# Patient Record
Sex: FEMALE | Race: WHITE | NOT HISPANIC OR LATINO | URBAN - METROPOLITAN AREA
[De-identification: names, ages, dates, MRNs, and addresses within clinical notes are randomized per-mention and may not be internally consistent; named-entity substitution may affect disease eponyms.]

---

## 2017-08-16 PROBLEM — Z00.00 ENCOUNTER FOR PREVENTIVE HEALTH EXAMINATION: Status: ACTIVE | Noted: 2017-08-16

## 2017-08-30 ENCOUNTER — EMERGENCY (EMERGENCY)
Facility: HOSPITAL | Age: 32
LOS: 1 days | Discharge: HOME | End: 2017-08-30
Admitting: EMERGENCY MEDICINE
Payer: COMMERCIAL

## 2017-08-30 VITALS
HEART RATE: 83 BPM | OXYGEN SATURATION: 100 % | DIASTOLIC BLOOD PRESSURE: 97 MMHG | SYSTOLIC BLOOD PRESSURE: 134 MMHG | RESPIRATION RATE: 16 BRPM | TEMPERATURE: 98 F

## 2017-08-30 PROCEDURE — 99284 EMERGENCY DEPT VISIT MOD MDM: CPT
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
## 2017-08-31 PROCEDURE — 73502 X-RAY EXAM HIP UNI 2-3 VIEWS: CPT | Mod: 26,RT

## 2017-08-31 PROCEDURE — 73552 X-RAY EXAM OF FEMUR 2/>: CPT | Mod: 26,RT

## 2017-08-31 PROCEDURE — 72125 CT NECK SPINE W/O DYE: CPT | Mod: 26

## 2017-08-31 PROCEDURE — 73564 X-RAY EXAM KNEE 4 OR MORE: CPT | Mod: 26,RT

## 2017-08-31 PROCEDURE — 70450 CT HEAD/BRAIN W/O DYE: CPT | Mod: 26

## 2017-08-31 PROCEDURE — 70486 CT MAXILLOFACIAL W/O DYE: CPT | Mod: 26

## 2017-08-31 RX ORDER — DIAZEPAM 5 MG
1 TABLET ORAL
Qty: 15 | Refills: 0
Start: 2017-08-31

## 2017-08-31 RX ORDER — IBUPROFEN 200 MG
1 TABLET ORAL
Qty: 30 | Refills: 0
Start: 2017-08-31

## 2017-08-31 RX ORDER — CEPHALEXIN 500 MG
1 CAPSULE ORAL
Qty: 10 | Refills: 0 | OUTPATIENT
Start: 2017-08-31 | End: 2017-09-05

## 2017-08-31 RX ORDER — SODIUM CHLORIDE 9 MG/ML
1000 INJECTION INTRAMUSCULAR; INTRAVENOUS; SUBCUTANEOUS ONCE
Qty: 0 | Refills: 0 | Status: COMPLETED | OUTPATIENT
Start: 2017-08-31 | End: 2017-08-31

## 2017-08-31 RX ORDER — ACETAMINOPHEN 500 MG
975 TABLET ORAL ONCE
Qty: 0 | Refills: 0 | Status: COMPLETED | OUTPATIENT
Start: 2017-08-31 | End: 2017-08-31

## 2017-08-31 RX ORDER — TETANUS TOXOID, REDUCED DIPHTHERIA TOXOID AND ACELLULAR PERTUSSIS VACCINE, ADSORBED 5; 2.5; 8; 8; 2.5 [IU]/.5ML; [IU]/.5ML; UG/.5ML; UG/.5ML; UG/.5ML
0.5 SUSPENSION INTRAMUSCULAR ONCE
Qty: 0 | Refills: 0 | Status: COMPLETED | OUTPATIENT
Start: 2017-08-31 | End: 2017-08-31

## 2017-08-31 RX ADMIN — TETANUS TOXOID, REDUCED DIPHTHERIA TOXOID AND ACELLULAR PERTUSSIS VACCINE, ADSORBED 0.5 MILLILITER(S): 5; 2.5; 8; 8; 2.5 SUSPENSION INTRAMUSCULAR at 03:07

## 2017-08-31 RX ADMIN — Medication 975 MILLIGRAM(S): at 04:10

## 2017-08-31 RX ADMIN — SODIUM CHLORIDE 1000 MILLILITER(S): 9 INJECTION INTRAMUSCULAR; INTRAVENOUS; SUBCUTANEOUS at 03:41
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
## 2017-08-31 NOTE — ED PROVIDER NOTE - PLAN OF CARE
Follow up with your primary medical doctor within 1-2 days of ER discharge.  Follow up with your plastic surgeon within the next few days.  Follow up with your dentist or the [facility] within the coming week; call tomorrow to schedule an appointment ([phone]); be sure to mention that this is a follow up to your ED visit.  If you need to find any doctor(s) to follow up with, you may call the [facility] Patient Access Services helpline at [phone] to find names/contact #s for a practitioner (or specialist) to follow up with.  Bring your discharge papers / test results with you to your follow up appointment(s).  Rest / no strenuous activity.  Stay well hydrated.  Cool compresses can be applied to any areas of injury to help improve swelling.  Eat a SOFT diet.  MEDICATIONS:  See attached medication sheet(s).  To follow up on any pending results, you may call the main ED # [phone] from 8:30am - 4pm; please ask to speak to the ED Administrative PA or Resident.  ***Return to the Emergency Department if you experience any new / worsening symptoms or have any problems / concerns.***
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
## 2017-08-31 NOTE — ED PROVIDER NOTE - PROGRESS NOTE DETAILS
VALERIA Redmond Addendum----  This patient was signed out to me by VALERIA Hughes at 0315 hrs; pt pending CT scans/results.  Dental team completed their evaluation; recommended soft diet, OTC pain medication, and f/u in dental clinic.  Xrays of right knee/femur/hip prelim reads state no acute findings; pt will be given Admin # to f/u on official reads expected on day shift.  CT head/cspine/maxillofacial negative for acute pathology with exception of "There are acute minimally displaced bilateral nasal bone fractures. No other acute fractures are noted."  Pt made aware of findings; pt states hx/o remote rhinoplasty; pt advised to closely f/u with her plastic surgeon; pt states she will arrange this f/u.  Pt. will be d/c'd per below instructions as discussed with VALERIA Hughes.
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
## 2018-07-06 ENCOUNTER — EMERGENCY (EMERGENCY)
Facility: HOSPITAL | Age: 33
LOS: 1 days | Discharge: HOME | End: 2018-07-06
Attending: EMERGENCY MEDICINE | Admitting: EMERGENCY MEDICINE
Payer: MEDICAID

## 2018-07-06 VITALS
DIASTOLIC BLOOD PRESSURE: 70 MMHG | OXYGEN SATURATION: 100 % | SYSTOLIC BLOOD PRESSURE: 135 MMHG | RESPIRATION RATE: 16 BRPM | HEART RATE: 74 BPM | TEMPERATURE: 99 F

## 2018-07-06 VITALS
SYSTOLIC BLOOD PRESSURE: 97 MMHG | RESPIRATION RATE: 15 BRPM | DIASTOLIC BLOOD PRESSURE: 61 MMHG | TEMPERATURE: 98 F | HEART RATE: 66 BPM | OXYGEN SATURATION: 100 %

## 2018-07-06 LAB
ALBUMIN SERPL ELPH-MCNC: 4.9 G/DL — SIGNIFICANT CHANGE UP (ref 3.3–5)
ALP SERPL-CCNC: 48 U/L — SIGNIFICANT CHANGE UP (ref 40–120)
ALT FLD-CCNC: 11 U/L — SIGNIFICANT CHANGE UP (ref 4–33)
ANISOCYTOSIS BLD QL: SLIGHT — SIGNIFICANT CHANGE UP
APPEARANCE UR: CLEAR — SIGNIFICANT CHANGE UP
AST SERPL-CCNC: 13 U/L — SIGNIFICANT CHANGE UP (ref 4–32)
BACTERIA # UR AUTO: SIGNIFICANT CHANGE UP
BASE EXCESS BLDV CALC-SCNC: 0.7 MMOL/L — SIGNIFICANT CHANGE UP
BASOPHILS # BLD AUTO: 0.03 K/UL — SIGNIFICANT CHANGE UP (ref 0–0.2)
BASOPHILS NFR BLD AUTO: 0.3 % — SIGNIFICANT CHANGE UP (ref 0–2)
BASOPHILS NFR SPEC: 0 % — SIGNIFICANT CHANGE UP (ref 0–2)
BILIRUB SERPL-MCNC: 0.8 MG/DL — SIGNIFICANT CHANGE UP (ref 0.2–1.2)
BILIRUB UR-MCNC: NEGATIVE — SIGNIFICANT CHANGE UP
BLASTS # FLD: 0 % — SIGNIFICANT CHANGE UP (ref 0–0)
BLOOD GAS VENOUS - CREATININE: 0.62 MG/DL — SIGNIFICANT CHANGE UP (ref 0.5–1.3)
BLOOD UR QL VISUAL: NEGATIVE — SIGNIFICANT CHANGE UP
BUN SERPL-MCNC: 8 MG/DL — SIGNIFICANT CHANGE UP (ref 7–23)
CALCIUM SERPL-MCNC: 9.7 MG/DL — SIGNIFICANT CHANGE UP (ref 8.4–10.5)
CHLORIDE BLDV-SCNC: 108 MMOL/L — SIGNIFICANT CHANGE UP (ref 96–108)
CHLORIDE SERPL-SCNC: 101 MMOL/L — SIGNIFICANT CHANGE UP (ref 98–107)
CO2 SERPL-SCNC: 25 MMOL/L — SIGNIFICANT CHANGE UP (ref 22–31)
COLOR SPEC: SIGNIFICANT CHANGE UP
CREAT SERPL-MCNC: 0.66 MG/DL — SIGNIFICANT CHANGE UP (ref 0.5–1.3)
EOSINOPHIL # BLD AUTO: 0.07 K/UL — SIGNIFICANT CHANGE UP (ref 0–0.5)
EOSINOPHIL NFR BLD AUTO: 0.7 % — SIGNIFICANT CHANGE UP (ref 0–6)
EOSINOPHIL NFR FLD: 1.7 % — SIGNIFICANT CHANGE UP (ref 0–6)
GAS PNL BLDV: 136 MMOL/L — SIGNIFICANT CHANGE UP (ref 136–146)
GIANT PLATELETS BLD QL SMEAR: PRESENT — SIGNIFICANT CHANGE UP
GLUCOSE BLDV-MCNC: 90 — SIGNIFICANT CHANGE UP (ref 70–99)
GLUCOSE SERPL-MCNC: 84 MG/DL — SIGNIFICANT CHANGE UP (ref 70–99)
GLUCOSE UR-MCNC: NEGATIVE — SIGNIFICANT CHANGE UP
HCG SERPL-ACNC: SIGNIFICANT CHANGE UP MIU/ML
HCO3 BLDV-SCNC: 24 MMOL/L — SIGNIFICANT CHANGE UP (ref 20–27)
HCT VFR BLD CALC: 36.7 % — SIGNIFICANT CHANGE UP (ref 34.5–45)
HCT VFR BLDV CALC: 38.5 % — SIGNIFICANT CHANGE UP (ref 34.5–45)
HGB BLD-MCNC: 11.4 G/DL — LOW (ref 11.5–15.5)
HGB BLDV-MCNC: 12.5 G/DL — SIGNIFICANT CHANGE UP (ref 11.5–15.5)
HYPOCHROMIA BLD QL: SLIGHT — SIGNIFICANT CHANGE UP
IMM GRANULOCYTES # BLD AUTO: 0.04 # — SIGNIFICANT CHANGE UP
IMM GRANULOCYTES NFR BLD AUTO: 0.4 % — SIGNIFICANT CHANGE UP (ref 0–1.5)
KETONES UR-MCNC: NEGATIVE — SIGNIFICANT CHANGE UP
LACTATE BLDV-MCNC: 2.4 MMOL/L — HIGH (ref 0.5–2)
LEUKOCYTE ESTERASE UR-ACNC: NEGATIVE — SIGNIFICANT CHANGE UP
LYMPHOCYTES # BLD AUTO: 3.54 K/UL — HIGH (ref 1–3.3)
LYMPHOCYTES # BLD AUTO: 33.7 % — SIGNIFICANT CHANGE UP (ref 13–44)
LYMPHOCYTES NFR SPEC AUTO: 27.8 % — SIGNIFICANT CHANGE UP (ref 13–44)
MCHC RBC-ENTMCNC: 21.4 PG — LOW (ref 27–34)
MCHC RBC-ENTMCNC: 31.1 % — LOW (ref 32–36)
MCV RBC AUTO: 68.9 FL — LOW (ref 80–100)
METAMYELOCYTES # FLD: 0 % — SIGNIFICANT CHANGE UP (ref 0–1)
MICROCYTES BLD QL: SIGNIFICANT CHANGE UP
MONOCYTES # BLD AUTO: 0.74 K/UL — SIGNIFICANT CHANGE UP (ref 0–0.9)
MONOCYTES NFR BLD AUTO: 7 % — SIGNIFICANT CHANGE UP (ref 2–14)
MONOCYTES NFR BLD: 4.4 % — SIGNIFICANT CHANGE UP (ref 2–9)
MYELOCYTES NFR BLD: 0 % — SIGNIFICANT CHANGE UP (ref 0–0)
NEUTROPHIL AB SER-ACNC: 62.6 % — SIGNIFICANT CHANGE UP (ref 43–77)
NEUTROPHILS # BLD AUTO: 6.09 K/UL — SIGNIFICANT CHANGE UP (ref 1.8–7.4)
NEUTROPHILS NFR BLD AUTO: 57.9 % — SIGNIFICANT CHANGE UP (ref 43–77)
NEUTS BAND # BLD: 0.9 % — SIGNIFICANT CHANGE UP (ref 0–6)
NITRITE UR-MCNC: NEGATIVE — SIGNIFICANT CHANGE UP
NON-SQ EPI CELLS # UR AUTO: <1 — SIGNIFICANT CHANGE UP
NRBC # FLD: 0 — SIGNIFICANT CHANGE UP
OTHER - HEMATOLOGY %: 0 — SIGNIFICANT CHANGE UP
OVALOCYTES BLD QL SMEAR: SLIGHT — SIGNIFICANT CHANGE UP
PCO2 BLDV: 42 MMHG — SIGNIFICANT CHANGE UP (ref 41–51)
PH BLDV: 7.39 PH — SIGNIFICANT CHANGE UP (ref 7.32–7.43)
PH UR: 7 — SIGNIFICANT CHANGE UP (ref 4.6–8)
PLATELET # BLD AUTO: 298 K/UL — SIGNIFICANT CHANGE UP (ref 150–400)
PLATELET COUNT - ESTIMATE: NORMAL — SIGNIFICANT CHANGE UP
PMV BLD: 10.7 FL — SIGNIFICANT CHANGE UP (ref 7–13)
PO2 BLDV: 31 MMHG — LOW (ref 35–40)
POLYCHROMASIA BLD QL SMEAR: SLIGHT — SIGNIFICANT CHANGE UP
POTASSIUM BLDV-SCNC: 3.6 MMOL/L — SIGNIFICANT CHANGE UP (ref 3.4–4.5)
POTASSIUM SERPL-MCNC: 3.6 MMOL/L — SIGNIFICANT CHANGE UP (ref 3.5–5.3)
POTASSIUM SERPL-SCNC: 3.6 MMOL/L — SIGNIFICANT CHANGE UP (ref 3.5–5.3)
PROMYELOCYTES # FLD: 0 % — SIGNIFICANT CHANGE UP (ref 0–0)
PROT SERPL-MCNC: 7.6 G/DL — SIGNIFICANT CHANGE UP (ref 6–8.3)
PROT UR-MCNC: NEGATIVE MG/DL — SIGNIFICANT CHANGE UP
RBC # BLD: 5.33 M/UL — HIGH (ref 3.8–5.2)
RBC # FLD: 14.7 % — HIGH (ref 10.3–14.5)
RBC CASTS # UR COMP ASSIST: SIGNIFICANT CHANGE UP (ref 0–?)
REVIEW TO FOLLOW: YES — SIGNIFICANT CHANGE UP
SAO2 % BLDV: 51.3 % — LOW (ref 60–85)
SODIUM SERPL-SCNC: 137 MMOL/L — SIGNIFICANT CHANGE UP (ref 135–145)
SP GR SPEC: 1 — SIGNIFICANT CHANGE UP (ref 1–1.04)
SQUAMOUS # UR AUTO: SIGNIFICANT CHANGE UP
UROBILINOGEN FLD QL: NORMAL MG/DL — SIGNIFICANT CHANGE UP
VARIANT LYMPHS # BLD: 2.6 % — SIGNIFICANT CHANGE UP
WBC # BLD: 10.51 K/UL — HIGH (ref 3.8–10.5)
WBC # FLD AUTO: 10.51 K/UL — HIGH (ref 3.8–10.5)
WBC UR QL: SIGNIFICANT CHANGE UP (ref 0–?)

## 2018-07-06 PROCEDURE — 99284 EMERGENCY DEPT VISIT MOD MDM: CPT

## 2018-07-06 PROCEDURE — 71046 X-RAY EXAM CHEST 2 VIEWS: CPT | Mod: 26

## 2018-07-06 PROCEDURE — 76830 TRANSVAGINAL US NON-OB: CPT | Mod: 26

## 2018-07-06 RX ORDER — ONDANSETRON 8 MG/1
4 TABLET, FILM COATED ORAL ONCE
Qty: 0 | Refills: 0 | Status: COMPLETED | OUTPATIENT
Start: 2018-07-06 | End: 2018-07-06

## 2018-07-06 RX ORDER — SODIUM CHLORIDE 9 MG/ML
1000 INJECTION INTRAMUSCULAR; INTRAVENOUS; SUBCUTANEOUS ONCE
Qty: 0 | Refills: 0 | Status: COMPLETED | OUTPATIENT
Start: 2018-07-06 | End: 2018-07-06

## 2018-07-06 RX ADMIN — ONDANSETRON 4 MILLIGRAM(S): 8 TABLET, FILM COATED ORAL at 19:47

## 2018-07-06 RX ADMIN — SODIUM CHLORIDE 1000 MILLILITER(S): 9 INJECTION INTRAMUSCULAR; INTRAVENOUS; SUBCUTANEOUS at 19:47

## 2018-07-06 NOTE — ED ADULT NURSE NOTE - OBJECTIVE STATEMENT
pt is a 32 yr female c/o cramping/ abd pain/ anxiety x 1 day. pt has + pregnancy test at home. piv placed, labs sent. no s/s of acute distress. vss, pt ekg done x2 with stable ekg noted. +nausea, no vomitting/ diahrea. + frequent urination with mild back pain. will ctm. boyfriend at bedside

## 2018-07-06 NOTE — ED PROVIDER NOTE - OBJECTIVE STATEMENT
Patient is 32 y F  3 weeks pregnant with no PMH no daily meds presenting with generalized weakness, crampy sharp bilateral inguinal abd pain worse when coughing for the last 2 weeks. Has been vomiting several times per day, nonbloody, She took home pregnancy test, which was positive, since then has had increased abd pain, intermittent nonexertional SOB, dizziness. Had spotting 2-3 weeks ago, no vaginal bleeding this week.   Has not had ultrasound or seen OB.   LMP: 5/3  No abdominal surgeries    PMD: none  OB: none  ROS: Denies fever, palpitations, chills, recent sickness, HA, vision changes, cough, chest pain, dysuria, hematuria, rash, new joint aches, sick contacts, and recent travel.

## 2018-07-06 NOTE — ED PROVIDER NOTE - ATTENDING CONTRIBUTION TO CARE
Dr. Castellanos:  I have personally performed a face to face bedside history and physical examination of this patient. I have discussed the history, examination, review of systems, assessment and plan of management with the resident. I have reviewed the electronic medical record and amended it to reflect my history, review of systems, physical exam, assessment and plan.    32F  LMP 5/3 presents with c/o intermittent pelvic pain radiating to back that is worse when coughing.  +intermittent SOB associated with the pain.  Also endorses N/V and generalized weakness.  Found out she is pregnant 3 days ago via home pregnancy test.  +increased urinary frequency    Exam:  - seems anxious  - rrr  - ctab  - abd soft ntnd, no cvat  - no focal neuro deficits    A/P  - abd pain in pregnancy, r/o ectopic, eval for UTI; cough and SOB intermittent, eval PNA  - cbc, cmp, hcg, ua, urine culture  - CXR, TVUS  - zofran, IVF

## 2018-07-06 NOTE — ED ADULT NURSE NOTE - CHIEF COMPLAINT QUOTE
Pt c/o weakness/dizziness/abd cramping/anxiety x 3 days. Pt states she is 3 weeks pregnant. Denies vag bleeding. Pt appears comfortable.  pt upgraded due to abnormal EKG as per MD
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
## 2021-11-01 ENCOUNTER — EMERGENCY (EMERGENCY)
Facility: HOSPITAL | Age: 36
LOS: 1 days | Discharge: HOME | End: 2021-11-01
Attending: HOSPITALIST | Admitting: HOSPITALIST
Payer: COMMERCIAL

## 2021-11-01 VITALS
HEART RATE: 69 BPM | TEMPERATURE: 98 F | RESPIRATION RATE: 18 BRPM | DIASTOLIC BLOOD PRESSURE: 67 MMHG | SYSTOLIC BLOOD PRESSURE: 118 MMHG | OXYGEN SATURATION: 100 %

## 2021-11-01 DIAGNOSIS — Z98.82 BREAST IMPLANT STATUS: Chronic | ICD-10-CM

## 2021-11-01 DIAGNOSIS — Z98.890 OTHER SPECIFIED POSTPROCEDURAL STATES: Chronic | ICD-10-CM

## 2021-11-01 LAB
ALBUMIN SERPL ELPH-MCNC: 4.2 G/DL — SIGNIFICANT CHANGE UP (ref 3.3–5)
ALP SERPL-CCNC: 48 U/L — SIGNIFICANT CHANGE UP (ref 40–120)
ALT FLD-CCNC: 26 U/L — SIGNIFICANT CHANGE UP (ref 4–33)
ANION GAP SERPL CALC-SCNC: 9 MMOL/L — SIGNIFICANT CHANGE UP (ref 7–14)
AST SERPL-CCNC: 20 U/L — SIGNIFICANT CHANGE UP (ref 4–32)
BILIRUB SERPL-MCNC: 0.3 MG/DL — SIGNIFICANT CHANGE UP (ref 0.2–1.2)
BUN SERPL-MCNC: 8 MG/DL — SIGNIFICANT CHANGE UP (ref 7–23)
CALCIUM SERPL-MCNC: 8.7 MG/DL — SIGNIFICANT CHANGE UP (ref 8.4–10.5)
CHLORIDE SERPL-SCNC: 106 MMOL/L — SIGNIFICANT CHANGE UP (ref 98–107)
CO2 SERPL-SCNC: 21 MMOL/L — LOW (ref 22–31)
CREAT SERPL-MCNC: 0.48 MG/DL — LOW (ref 0.5–1.3)
GLUCOSE SERPL-MCNC: 79 MG/DL — SIGNIFICANT CHANGE UP (ref 70–99)
HCG SERPL-ACNC: SIGNIFICANT CHANGE UP MIU/ML
HCT VFR BLD CALC: 30.8 % — LOW (ref 34.5–45)
HGB BLD-MCNC: 10.2 G/DL — LOW (ref 11.5–15.5)
IANC: 4.3 K/UL — SIGNIFICANT CHANGE UP (ref 1.5–8.5)
MCHC RBC-ENTMCNC: 22.4 PG — LOW (ref 27–34)
MCHC RBC-ENTMCNC: 33.1 GM/DL — SIGNIFICANT CHANGE UP (ref 32–36)
MCV RBC AUTO: 67.7 FL — LOW (ref 80–100)
PLATELET # BLD AUTO: 279 K/UL — SIGNIFICANT CHANGE UP (ref 150–400)
POTASSIUM SERPL-MCNC: 4 MMOL/L — SIGNIFICANT CHANGE UP (ref 3.5–5.3)
POTASSIUM SERPL-SCNC: 4 MMOL/L — SIGNIFICANT CHANGE UP (ref 3.5–5.3)
PROT SERPL-MCNC: 6.5 G/DL — SIGNIFICANT CHANGE UP (ref 6–8.3)
RBC # BLD: 4.55 M/UL — SIGNIFICANT CHANGE UP (ref 3.8–5.2)
RBC # FLD: 14.7 % — HIGH (ref 10.3–14.5)
SODIUM SERPL-SCNC: 136 MMOL/L — SIGNIFICANT CHANGE UP (ref 135–145)
WBC # BLD: 8.37 K/UL — SIGNIFICANT CHANGE UP (ref 3.8–10.5)
WBC # FLD AUTO: 8.37 K/UL — SIGNIFICANT CHANGE UP (ref 3.8–10.5)

## 2021-11-01 PROCEDURE — 76705 ECHO EXAM OF ABDOMEN: CPT | Mod: 26

## 2021-11-01 PROCEDURE — 99285 EMERGENCY DEPT VISIT HI MDM: CPT

## 2021-11-01 PROCEDURE — 76805 OB US >/= 14 WKS SNGL FETUS: CPT | Mod: 26

## 2021-11-01 RX ORDER — LIDOCAINE 4 G/100G
1 CREAM TOPICAL ONCE
Refills: 0 | Status: COMPLETED | OUTPATIENT
Start: 2021-11-01 | End: 2021-11-01

## 2021-11-01 RX ORDER — ACETAMINOPHEN 500 MG
975 TABLET ORAL ONCE
Refills: 0 | Status: COMPLETED | OUTPATIENT
Start: 2021-11-01 | End: 2021-11-01

## 2021-11-01 RX ORDER — SODIUM CHLORIDE 9 MG/ML
1000 INJECTION INTRAMUSCULAR; INTRAVENOUS; SUBCUTANEOUS ONCE
Refills: 0 | Status: COMPLETED | OUTPATIENT
Start: 2021-11-01 | End: 2021-11-01

## 2021-11-01 RX ADMIN — Medication 975 MILLIGRAM(S): at 22:31

## 2021-11-01 RX ADMIN — LIDOCAINE 1 PATCH: 4 CREAM TOPICAL at 22:31
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
## 2021-11-01 NOTE — ED PROVIDER NOTE - PATIENT PORTAL LINK FT
You can access the FollowMyHealth Patient Portal offered by [facility] by registering at the following website: [email or web address]. By joining [facility]’s FollowMyHealth portal, you will also be able to view your health information using other applications (apps) compatible with our system.
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
## 2021-11-01 NOTE — ED PROVIDER NOTE - CRANIAL NERVE AND PUPILLARY EXAM
5/5 strength and intact sensation bilaterally in upper and lower extremity bilaterally./cranial nerves 2-12 intact
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
## 2021-11-01 NOTE — ED PROVIDER NOTE - NSCAREINITIATED _GEN_ER
9:42 PM  
55 y.o. female presents to ED C/O rash. Patient denies pertinent medical history. Patient reports rash developed about 4 days ago, rash developed after trying a new liquor drink. Patient denies associated lip swelling, tongue swelling, trouble swallowing, shortness of breath. Patient reports rash is painful, and it is tender to palpation, her bra is bothering it. Rash is located on back. Patient has tried Benadryl a few times without improvement in symptoms. Patient is a non-smoker. Patient denies any other symptoms or complaints. Past Medical History:  
Diagnosis Date  Degenerative tear of lateral meniscus of left knee 12/20/2015 Past Surgical History:  
Procedure Laterality Date 2001 [facility] KNEE ARTHROSCOPY  7-2-2015  
 left History reviewed. No pertinent family history. Social History Socioeconomic History  Marital status:  Spouse name: Not on file  Number of children: Not on file  Years of education: Not on file  Highest education level: Not on file Occupational History  Not on file Social Needs  Financial resource strain: Not on file  Food insecurity:  
  Worry: Not on file Inability: Not on file  Transportation needs:  
  Medical: Not on file Non-medical: Not on file Tobacco Use  Smoking status: Never Smoker  Smokeless tobacco: Never Used Substance and Sexual Activity  Alcohol use: Yes Alcohol/week: 1.2 oz Types: 2 Glasses of wine per week  Drug use: No  
 Sexual activity: Not Currently Lifestyle  Physical activity:  
  Days per week: Not on file Minutes per session: Not on file  Stress: Not on file Relationships  Social connections:  
  Talks on phone: Not on file Gets together: Not on file Attends Congregation service: Not on file Active member of club or organization: Not on file Attends meetings of clubs or organizations: Not on file Relationship status: Not on file  Intimate partner violence:  
  Fear of current or ex partner: Not on file Emotionally abused: Not on file Physically abused: Not on file Forced sexual activity: Not on file Other Topics Concern  Not on file Social History Narrative  Not on file ALLERGIES: Patient has no known allergies. Review of Systems Constitutional: Negative for appetite change and fever. HENT: Negative for congestion, rhinorrhea and sore throat. Respiratory: Negative for cough, shortness of breath and wheezing. Cardiovascular: Negative for chest pain and leg swelling. Gastrointestinal: Negative for abdominal pain, constipation, diarrhea, nausea and vomiting. Genitourinary: Negative for dysuria. Musculoskeletal: Negative for arthralgias and back pain. Skin: Positive for rash. Neurological: Negative for dizziness, syncope and headaches. All other systems reviewed and are negative. Vitals:  
 03/27/19 2113 BP: 105/72 Pulse: (!) 59 Resp: 18 Temp: 98.1 °F (36.7 °C) SpO2: 100% Weight: 62.1 kg (137 lb) Height: 5' 8\" (1.727 m) Physical Exam  
Constitutional: She is oriented to person, place, and time. She appears well-developed and well-nourished. No distress. HENT:  
Head: Atraumatic. Right Ear: External ear normal.  
Left Ear: External ear normal.  
Mouth/Throat: Oropharynx is clear and moist.  
Cardiovascular: Normal rate, regular rhythm and intact distal pulses. Pulmonary/Chest: Effort normal and breath sounds normal. No stridor. No respiratory distress. She has no wheezes. She has no rales. Musculoskeletal: Normal range of motion. Neurological: She is alert and oriented to person, place, and time. She exhibits normal muscle tone. Coordination normal.  
Skin: Rash noted. She is not diaphoretic. Nursing note and vitals reviewed.  
  
 
SEPIDEH 
 Number of Diagnoses or Management Options Herpes zoster without complication:  
Diagnosis management comments: Clinical impression: Shingles Physical exam is consistent with shingles, there is no ocular or ear involvement. Rash is tender and painful, educated to start viral treatment though slightly outside treatment window. Patient educated about nonpharmacological interventions for her discomfort. Patient referred to her primary care doctor for further evaluation. Patient educated to return the ED for any new or worsening symptoms. Patient denies further questions Procedures RESULTS: 
 
No orders to display Labs Reviewed - No data to display No results found for this or any previous visit (from the past 12 hour(s)). PROGRESS NOTE:  
9:42 PM  
Initial assessment completed. Written by Helen FISHER 
 
DISCHARGE NOTE: 
 
Junious All  results have been reviewed with her. She has been counseled regarding her diagnosis, treatment, and plan. She verbally conveys understanding and agreement of the signs, symptoms, diagnosis, treatment and prognosis and additionally agrees to follow up as discussed. She also agrees with the care-plan and conveys that all of her questions have been answered. I have also provided discharge instructions for her that include: educational information regarding their diagnosis and treatment, and list of reasons why they would want to return to the ED prior to their follow-up appointment, should her condition change. CLINICAL IMPRESSION: 
 
1. Herpes zoster without complication AFTER VISIT PLAN: 
 
Discharge Medication List as of 3/27/2019 10:06 PM  
  
START taking these medications Details  
acyclovir (ZOVIRAX) 800 mg tablet Take 1 Tab by mouth four (4) times daily for 5 days. , Print, Disp-20 Tab, R-0  
  
  
CONTINUE these medications which have NOT CHANGED Details ASPIRIN/SALICYLAMIDE/CAFFEINE (BC HEADACHE POWDER PO) Take 1 Packet by mouth as needed., Historical Med DOCOSAHEXANOIC ACID/EPA (FISH OIL PO) Take 1,200 mg by mouth daily. , Historical Med  
  
calcium-cholecalciferol, d3, (CALCIUM 600 + D) 600-125 mg-unit tab Take  by mouth daily. , Historical Med  
  
multivitamin, tx-iron-ca-min (THERA-M W/ IRON) 9 mg iron-400 mcg tab tablet Take 1 Tab by mouth daily. , Historical Med Biotin 2,500 mcg cap Take  by mouth daily. , Historical Med  
  
  
STOP taking these medications  
  
 oxyCODONE-acetaminophen (PERCOCET) 5-325 mg per tablet Comments:  
Reason for Stopping: Follow-up Information Follow up With Specialties Details Why Contact Info Odin Rushing MD Family Practice Schedule an appointment as soon as possible for a visit in 1 week As needed [address] [address] 
[phone] [address] [facility] EMERGENCY DEPT Emergency Medicine Go to If symptoms worsen Maz[place] [address] [place], [place] [id number] [phone] Written by Saeid Garces NP-C 
 
 
 
 
 
 
 
 Krystal Robert)
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
## 2021-11-02 VITALS
RESPIRATION RATE: 18 BRPM | OXYGEN SATURATION: 100 % | DIASTOLIC BLOOD PRESSURE: 72 MMHG | TEMPERATURE: 98 F | SYSTOLIC BLOOD PRESSURE: 100 MMHG | HEART RATE: 72 BPM

## 2021-11-02 LAB
APPEARANCE UR: ABNORMAL
BACTERIA # UR AUTO: ABNORMAL
BASOPHILS # BLD AUTO: 0.08 K/UL — SIGNIFICANT CHANGE UP (ref 0–0.2)
BASOPHILS NFR BLD AUTO: 0.9 % — SIGNIFICANT CHANGE UP (ref 0–2)
BILIRUB UR-MCNC: NEGATIVE — SIGNIFICANT CHANGE UP
BLD GP AB SCN SERPL QL: NEGATIVE — SIGNIFICANT CHANGE UP
COLOR SPEC: COLORLESS — SIGNIFICANT CHANGE UP
DIFF PNL FLD: NEGATIVE — SIGNIFICANT CHANGE UP
EOSINOPHIL # BLD AUTO: 0.08 K/UL — SIGNIFICANT CHANGE UP (ref 0–0.5)
EOSINOPHIL NFR BLD AUTO: 0.9 % — SIGNIFICANT CHANGE UP (ref 0–6)
EPI CELLS # UR: ABNORMAL
GLUCOSE UR QL: NEGATIVE — SIGNIFICANT CHANGE UP
KETONES UR-MCNC: ABNORMAL
LEUKOCYTE ESTERASE UR-ACNC: NEGATIVE — SIGNIFICANT CHANGE UP
LYMPHOCYTES # BLD AUTO: 2.49 K/UL — SIGNIFICANT CHANGE UP (ref 1–3.3)
LYMPHOCYTES # BLD AUTO: 29.8 % — SIGNIFICANT CHANGE UP (ref 13–44)
MONOCYTES # BLD AUTO: 0.59 K/UL — SIGNIFICANT CHANGE UP (ref 0–0.9)
MONOCYTES NFR BLD AUTO: 7 % — SIGNIFICANT CHANGE UP (ref 2–14)
NEUTROPHILS # BLD AUTO: 4.7 K/UL — SIGNIFICANT CHANGE UP (ref 1.8–7.4)
NEUTROPHILS NFR BLD AUTO: 56.1 % — SIGNIFICANT CHANGE UP (ref 43–77)
NITRITE UR-MCNC: NEGATIVE — SIGNIFICANT CHANGE UP
PH UR: 7 — SIGNIFICANT CHANGE UP (ref 5–8)
PROT UR-MCNC: ABNORMAL
RBC CASTS # UR COMP ASSIST: SIGNIFICANT CHANGE UP /HPF (ref 0–4)
RH IG SCN BLD-IMP: POSITIVE — SIGNIFICANT CHANGE UP
SP GR SPEC: 1.01 — SIGNIFICANT CHANGE UP (ref 1–1.05)
UROBILINOGEN FLD QL: SIGNIFICANT CHANGE UP
WBC UR QL: SIGNIFICANT CHANGE UP /HPF (ref 0–5)

## 2021-11-02 PROCEDURE — 70450 CT HEAD/BRAIN W/O DYE: CPT | Mod: 26,MA

## 2021-11-02 PROCEDURE — 72125 CT NECK SPINE W/O DYE: CPT | Mod: 26,MA

## 2021-11-02 RX ORDER — SODIUM CHLORIDE 9 MG/ML
1000 INJECTION INTRAMUSCULAR; INTRAVENOUS; SUBCUTANEOUS ONCE
Refills: 0 | Status: COMPLETED | OUTPATIENT
Start: 2021-11-02 | End: 2021-11-02

## 2021-11-02 RX ADMIN — SODIUM CHLORIDE 1000 MILLILITER(S): 9 INJECTION INTRAMUSCULAR; INTRAVENOUS; SUBCUTANEOUS at 00:04

## 2021-11-02 RX ADMIN — LIDOCAINE 1 PATCH: 4 CREAM TOPICAL at 00:06

## 2021-11-02 RX ADMIN — SODIUM CHLORIDE 1000 MILLILITER(S): 9 INJECTION INTRAMUSCULAR; INTRAVENOUS; SUBCUTANEOUS at 01:24

## 2021-11-03 LAB
CULTURE RESULTS: SIGNIFICANT CHANGE UP
SPECIMEN SOURCE: SIGNIFICANT CHANGE UP

## 2022-04-13 ENCOUNTER — OUTPATIENT (OUTPATIENT)
Dept: INPATIENT UNIT | Facility: HOSPITAL | Age: 37
LOS: 1 days | Discharge: HOME | End: 2022-04-13
Payer: MEDICAID

## 2022-04-13 VITALS — DIASTOLIC BLOOD PRESSURE: 77 MMHG | HEART RATE: 80 BPM | SYSTOLIC BLOOD PRESSURE: 119 MMHG

## 2022-04-13 VITALS
DIASTOLIC BLOOD PRESSURE: 72 MMHG | HEART RATE: 76 BPM | SYSTOLIC BLOOD PRESSURE: 106 MMHG | TEMPERATURE: 98 F | RESPIRATION RATE: 16 BRPM

## 2022-04-13 DIAGNOSIS — Z98.82 BREAST IMPLANT STATUS: Chronic | ICD-10-CM

## 2022-04-13 DIAGNOSIS — Z98.890 OTHER SPECIFIED POSTPROCEDURAL STATES: Chronic | ICD-10-CM

## 2022-04-13 DIAGNOSIS — O26.899 OTHER SPECIFIED PREGNANCY RELATED CONDITIONS, UNSPECIFIED TRIMESTER: ICD-10-CM

## 2022-04-13 DIAGNOSIS — Z3A.00 WEEKS OF GESTATION OF PREGNANCY NOT SPECIFIED: ICD-10-CM

## 2022-04-13 PROCEDURE — 76818 FETAL BIOPHYS PROFILE W/NST: CPT | Mod: 26

## 2022-04-13 PROCEDURE — 99213 OFFICE O/P EST LOW 20 MIN: CPT | Mod: 25

## 2022-04-13 NOTE — OB PROVIDER TRIAGE NOTE - HISTORY OF PRESENT ILLNESS
36 y.o.  MALCOLM 2022 @ 39.1 weeks presents with c/o LOF since 9am, "white lea fluid." Pt denies ctx, VB, pruitis. States +FM and uncomplicated antepartum course. Pt is registered for prenatal care with [facility].    allergies:  azithromycin - dyspnea  robitussin - dyspnea  medications:  prenatal vitamins    med/surg hx:   bilateral breast augmentation   septoplasty   d+c x 2    OBBYN hx:  2005

## 2022-04-13 NOTE — OB PROVIDER TRIAGE NOTE - NSHPPHYSICALEXAM_GEN_ALL_CORE
abdomen soft, nontender  taus: sliup, cephalic presentation, anterior placenta, bpp 8/8, sheldon 13.5, efw 2875 grams  sse: moderate creamy white discharge, no odor noted, negative pooling/nitrazine/ferning  sve: 1/50/-3/I  nst reactive  toco: ctx q occ ctx noted

## 2022-04-13 NOTE — OB PROVIDER TRIAGE NOTE - NSOBPROVIDERNOTE_OBGYN_ALL_OB_FT
a/p: 36 y.o.  MALCOLM 2022 @ 39.1 weeks     no evidence of ROM at this time  BPP 10/10 a/p: 36 y.o.  MALCOLM 2022 @ 39.1 weeks     no evidence of ROM at this time  BPP 10/10    discussed with Dr Mauro. Plan for discharge home with labor precautions/fetal kick counts reviewed. Encouraged increased PO hydration. F/U next scheduled prenatal appointment 2022.    plan of care with patient and patient partner. Pt states understanding of above plan. In total ~30 mins spent with established/new patient.    Magali, NP

## 2022-04-13 NOTE — OB RN TRIAGE NOTE - NSICDXPASTMEDICALHX_GEN_ALL_CORE_FT
PAST MEDICAL HISTORY:  No pertinent past medical history      PAST MEDICAL HISTORY:  No pertinent past medical history     Thalassemia trait

## 2022-04-13 NOTE — OB PROVIDER TRIAGE NOTE - ADDITIONAL INSTRUCTIONS
Identified need for reassessment
Please drink 1-2 liters of fluid per day. Please go to your next scheduled prenatal appointment on 4/20/2022.

## 2022-04-19 ENCOUNTER — TRANSCRIPTION ENCOUNTER (OUTPATIENT)
Age: 37
End: 2022-04-19

## 2022-04-19 ENCOUNTER — EMERGENCY (EMERGENCY)
Facility: HOSPITAL | Age: 37
LOS: 1 days | Discharge: STILL ADMITTED OR ADMINISTRATIVE | End: 2022-04-19
Admitting: EMERGENCY MEDICINE
Payer: MEDICAID

## 2022-04-19 ENCOUNTER — INPATIENT (INPATIENT)
Facility: HOSPITAL | Age: 37
LOS: 1 days | Discharge: HOME HEALTH | End: 2022-04-21
Attending: SPECIALIST | Admitting: SPECIALIST
Payer: MEDICAID

## 2022-04-19 VITALS
TEMPERATURE: 98 F | SYSTOLIC BLOOD PRESSURE: 125 MMHG | HEART RATE: 58 BPM | RESPIRATION RATE: 16 BRPM | DIASTOLIC BLOOD PRESSURE: 76 MMHG

## 2022-04-19 VITALS
SYSTOLIC BLOOD PRESSURE: 98 MMHG | OXYGEN SATURATION: 100 % | RESPIRATION RATE: 16 BRPM | DIASTOLIC BLOOD PRESSURE: 85 MMHG | TEMPERATURE: 98 F | HEART RATE: 75 BPM

## 2022-04-19 DIAGNOSIS — Z98.82 BREAST IMPLANT STATUS: Chronic | ICD-10-CM

## 2022-04-19 DIAGNOSIS — Z3A.00 WEEKS OF GESTATION OF PREGNANCY NOT SPECIFIED: ICD-10-CM

## 2022-04-19 DIAGNOSIS — Z98.890 OTHER SPECIFIED POSTPROCEDURAL STATES: Chronic | ICD-10-CM

## 2022-04-19 DIAGNOSIS — O26.899 OTHER SPECIFIED PREGNANCY RELATED CONDITIONS, UNSPECIFIED TRIMESTER: ICD-10-CM

## 2022-04-19 PROBLEM — D56.3 THALASSEMIA MINOR: Chronic | Status: ACTIVE | Noted: 2022-04-13

## 2022-04-19 LAB
AMPHET UR-MCNC: NEGATIVE — SIGNIFICANT CHANGE UP
BARBITURATES UR SCN-MCNC: NEGATIVE — SIGNIFICANT CHANGE UP
BASOPHILS # BLD AUTO: 0.02 K/UL — SIGNIFICANT CHANGE UP (ref 0–0.2)
BASOPHILS NFR BLD AUTO: 0.2 % — SIGNIFICANT CHANGE UP (ref 0–2)
BENZODIAZ UR-MCNC: NEGATIVE — SIGNIFICANT CHANGE UP
BLD GP AB SCN SERPL QL: NEGATIVE — SIGNIFICANT CHANGE UP
COCAINE METAB.OTHER UR-MCNC: NEGATIVE — SIGNIFICANT CHANGE UP
COVID-19 SPIKE DOMAIN AB INTERP: POSITIVE
COVID-19 SPIKE DOMAIN ANTIBODY RESULT: >250 U/ML — HIGH
CREATININE URINE RESULT, DAU: 19 MG/DL — SIGNIFICANT CHANGE UP
EOSINOPHIL # BLD AUTO: 0.07 K/UL — SIGNIFICANT CHANGE UP (ref 0–0.5)
EOSINOPHIL NFR BLD AUTO: 0.8 % — SIGNIFICANT CHANGE UP (ref 0–6)
HBV SURFACE AG SERPL QL IA: SIGNIFICANT CHANGE UP
HCT VFR BLD CALC: 33.8 % — LOW (ref 34.5–45)
HGB BLD-MCNC: 10.6 G/DL — LOW (ref 11.5–15.5)
HIV 1+2 AB+HIV1 P24 AG SERPL QL IA: SIGNIFICANT CHANGE UP
IANC: 5.07 K/UL — SIGNIFICANT CHANGE UP (ref 1.8–7.4)
IMM GRANULOCYTES NFR BLD AUTO: 1.1 % — SIGNIFICANT CHANGE UP (ref 0–1.5)
LYMPHOCYTES # BLD AUTO: 3.34 K/UL — HIGH (ref 1–3.3)
LYMPHOCYTES # BLD AUTO: 36.3 % — SIGNIFICANT CHANGE UP (ref 13–44)
MCHC RBC-ENTMCNC: 21.6 PG — LOW (ref 27–34)
MCHC RBC-ENTMCNC: 31.4 GM/DL — LOW (ref 32–36)
MCV RBC AUTO: 69 FL — LOW (ref 80–100)
METHADONE UR-MCNC: NEGATIVE — SIGNIFICANT CHANGE UP
MONOCYTES # BLD AUTO: 0.6 K/UL — SIGNIFICANT CHANGE UP (ref 0–0.9)
MONOCYTES NFR BLD AUTO: 6.5 % — SIGNIFICANT CHANGE UP (ref 2–14)
NEUTROPHILS # BLD AUTO: 5.07 K/UL — SIGNIFICANT CHANGE UP (ref 1.8–7.4)
NEUTROPHILS NFR BLD AUTO: 55.1 % — SIGNIFICANT CHANGE UP (ref 43–77)
NRBC # BLD: 0 /100 WBCS — SIGNIFICANT CHANGE UP
NRBC # FLD: 0 K/UL — SIGNIFICANT CHANGE UP
OPIATES UR-MCNC: NEGATIVE — SIGNIFICANT CHANGE UP
OXYCODONE UR-MCNC: NEGATIVE — SIGNIFICANT CHANGE UP
PCP SPEC-MCNC: SIGNIFICANT CHANGE UP
PCP UR-MCNC: NEGATIVE — SIGNIFICANT CHANGE UP
PLATELET # BLD AUTO: 193 K/UL — SIGNIFICANT CHANGE UP (ref 150–400)
RBC # BLD: 4.9 M/UL — SIGNIFICANT CHANGE UP (ref 3.8–5.2)
RBC # FLD: 15.7 % — HIGH (ref 10.3–14.5)
RH IG SCN BLD-IMP: POSITIVE — SIGNIFICANT CHANGE UP
RUBV IGG SER-ACNC: 10.9 INDEX — SIGNIFICANT CHANGE UP
RUBV IGG SER-IMP: POSITIVE — SIGNIFICANT CHANGE UP
SARS-COV-2 IGG+IGM SERPL QL IA: >250 U/ML — HIGH
SARS-COV-2 IGG+IGM SERPL QL IA: POSITIVE
SARS-COV-2 RNA SPEC QL NAA+PROBE: SIGNIFICANT CHANGE UP
T PALLIDUM AB TITR SER: NEGATIVE — SIGNIFICANT CHANGE UP
THC UR QL: POSITIVE
WBC # BLD: 9.2 K/UL — SIGNIFICANT CHANGE UP (ref 3.8–10.5)
WBC # FLD AUTO: 9.2 K/UL — SIGNIFICANT CHANGE UP (ref 3.8–10.5)

## 2022-04-19 PROCEDURE — 59409 OBSTETRICAL CARE: CPT | Mod: U9,UB,GC

## 2022-04-19 PROCEDURE — L9996: CPT

## 2022-04-19 RX ORDER — IBUPROFEN 200 MG
600 TABLET ORAL EVERY 6 HOURS
Refills: 0 | Status: DISCONTINUED | OUTPATIENT
Start: 2022-04-19 | End: 2022-04-21

## 2022-04-19 RX ORDER — IBUPROFEN 200 MG
600 TABLET ORAL EVERY 6 HOURS
Refills: 0 | Status: COMPLETED | OUTPATIENT
Start: 2022-04-19 | End: 2023-03-18

## 2022-04-19 RX ORDER — IBUPROFEN 200 MG
1 TABLET ORAL
Qty: 0 | Refills: 0 | DISCHARGE
Start: 2022-04-19

## 2022-04-19 RX ORDER — HYDROCORTISONE 1 %
1 OINTMENT (GRAM) TOPICAL EVERY 6 HOURS
Refills: 0 | Status: DISCONTINUED | OUTPATIENT
Start: 2022-04-19 | End: 2022-04-21

## 2022-04-19 RX ORDER — TETANUS TOXOID, REDUCED DIPHTHERIA TOXOID AND ACELLULAR PERTUSSIS VACCINE, ADSORBED 5; 2.5; 8; 8; 2.5 [IU]/.5ML; [IU]/.5ML; UG/.5ML; UG/.5ML; UG/.5ML
0.5 SUSPENSION INTRAMUSCULAR ONCE
Refills: 0 | Status: DISCONTINUED | OUTPATIENT
Start: 2022-04-19 | End: 2022-04-21

## 2022-04-19 RX ORDER — KETOROLAC TROMETHAMINE 30 MG/ML
30 SYRINGE (ML) INJECTION ONCE
Refills: 0 | Status: DISCONTINUED | OUTPATIENT
Start: 2022-04-19 | End: 2022-04-19

## 2022-04-19 RX ORDER — PRAMOXINE HYDROCHLORIDE 150 MG/15G
1 AEROSOL, FOAM RECTAL EVERY 4 HOURS
Refills: 0 | Status: DISCONTINUED | OUTPATIENT
Start: 2022-04-19 | End: 2022-04-21

## 2022-04-19 RX ORDER — OXYTOCIN 10 UNIT/ML
333.33 VIAL (ML) INJECTION
Qty: 20 | Refills: 0 | Status: DISCONTINUED | OUTPATIENT
Start: 2022-04-19 | End: 2022-04-21

## 2022-04-19 RX ORDER — OXYTOCIN 10 UNIT/ML
VIAL (ML) INJECTION
Qty: 20 | Refills: 0 | Status: DISCONTINUED | OUTPATIENT
Start: 2022-04-19 | End: 2022-04-21

## 2022-04-19 RX ORDER — LANOLIN
1 OINTMENT (GRAM) TOPICAL EVERY 6 HOURS
Refills: 0 | Status: DISCONTINUED | OUTPATIENT
Start: 2022-04-19 | End: 2022-04-21

## 2022-04-19 RX ORDER — SODIUM CHLORIDE 9 MG/ML
3 INJECTION INTRAMUSCULAR; INTRAVENOUS; SUBCUTANEOUS EVERY 8 HOURS
Refills: 0 | Status: DISCONTINUED | OUTPATIENT
Start: 2022-04-19 | End: 2022-04-21

## 2022-04-19 RX ORDER — ACETAMINOPHEN 500 MG
3 TABLET ORAL
Qty: 0 | Refills: 0 | DISCHARGE
Start: 2022-04-19

## 2022-04-19 RX ORDER — AER TRAVELER 0.5 G/1
1 SOLUTION RECTAL; TOPICAL EVERY 4 HOURS
Refills: 0 | Status: DISCONTINUED | OUTPATIENT
Start: 2022-04-19 | End: 2022-04-21

## 2022-04-19 RX ORDER — ONDANSETRON 8 MG/1
4 TABLET, FILM COATED ORAL ONCE
Refills: 0 | Status: COMPLETED | OUTPATIENT
Start: 2022-04-19 | End: 2022-04-19

## 2022-04-19 RX ORDER — SODIUM CHLORIDE 9 MG/ML
1000 INJECTION, SOLUTION INTRAVENOUS
Refills: 0 | Status: DISCONTINUED | OUTPATIENT
Start: 2022-04-19 | End: 2022-04-19

## 2022-04-19 RX ORDER — BENZOCAINE 10 %
1 GEL (GRAM) MUCOUS MEMBRANE EVERY 6 HOURS
Refills: 0 | Status: DISCONTINUED | OUTPATIENT
Start: 2022-04-19 | End: 2022-04-21

## 2022-04-19 RX ORDER — MAGNESIUM HYDROXIDE 400 MG/1
30 TABLET, CHEWABLE ORAL
Refills: 0 | Status: DISCONTINUED | OUTPATIENT
Start: 2022-04-19 | End: 2022-04-21

## 2022-04-19 RX ORDER — ACETAMINOPHEN 500 MG
975 TABLET ORAL
Refills: 0 | Status: DISCONTINUED | OUTPATIENT
Start: 2022-04-19 | End: 2022-04-21

## 2022-04-19 RX ORDER — SIMETHICONE 80 MG/1
80 TABLET, CHEWABLE ORAL EVERY 4 HOURS
Refills: 0 | Status: DISCONTINUED | OUTPATIENT
Start: 2022-04-19 | End: 2022-04-21

## 2022-04-19 RX ORDER — DIBUCAINE 1 %
1 OINTMENT (GRAM) RECTAL EVERY 6 HOURS
Refills: 0 | Status: DISCONTINUED | OUTPATIENT
Start: 2022-04-19 | End: 2022-04-21

## 2022-04-19 RX ORDER — DIPHENHYDRAMINE HCL 50 MG
25 CAPSULE ORAL EVERY 6 HOURS
Refills: 0 | Status: DISCONTINUED | OUTPATIENT
Start: 2022-04-19 | End: 2022-04-21

## 2022-04-19 RX ORDER — ONDANSETRON 8 MG/1
4 TABLET, FILM COATED ORAL ONCE
Refills: 0 | Status: DISCONTINUED | OUTPATIENT
Start: 2022-04-19 | End: 2022-04-19

## 2022-04-19 RX ORDER — OXYTOCIN 10 UNIT/ML
2 VIAL (ML) INJECTION
Qty: 30 | Refills: 0 | Status: DISCONTINUED | OUTPATIENT
Start: 2022-04-19 | End: 2022-04-19

## 2022-04-19 RX ADMIN — SODIUM CHLORIDE 3 MILLILITER(S): 9 INJECTION INTRAMUSCULAR; INTRAVENOUS; SUBCUTANEOUS at 22:17

## 2022-04-19 RX ADMIN — SODIUM CHLORIDE 125 MILLILITER(S): 9 INJECTION, SOLUTION INTRAVENOUS at 03:46

## 2022-04-19 RX ADMIN — Medication 30 MILLIGRAM(S): at 11:18

## 2022-04-19 RX ADMIN — Medication 2 MILLIUNIT(S)/MIN: at 06:11

## 2022-04-19 RX ADMIN — ONDANSETRON 4 MILLIGRAM(S): 8 TABLET, FILM COATED ORAL at 09:54

## 2022-04-19 NOTE — OB PROVIDER H&P - NSHPPHYSICALEXAM_GEN_ALL_CORE
ICU Vital Signs Last 24 Hrs  T(C): 36.5 (19 Apr 2022 02:49), Max: 36.7 (19 Apr 2022 01:48)  T(F): 97.7 (19 Apr 2022 02:49), Max: 98 (19 Apr 2022 01:48)  HR: 58 (19 Apr 2022 02:49) (58 - 75)  BP: 125/76 (19 Apr 2022 02:49) (98/85 - 125/76)  BP(mean): --  ABP: --  ABP(mean): --  RR: 16 (19 Apr 2022 02:37) (16 - 16)  SpO2: 100% (19 Apr 2022 01:48) (100% - 100%)    Abdomen soft nontender  SVE: 4/70/-3, grossly ruptured of clear fluid  TAS: Cephalic presentation   EFW: 3600gms by leopolds   FHR:   Nicole 3-4mins

## 2022-04-19 NOTE — DISCHARGE NOTE OB - MATERIALS PROVIDED
Vaccinations/  Immunization Record/Breastfeeding Log/Breastfeeding Mother’s Support Group Information/Guide to Postpartum Care/[place] Hearing Screen Program/Back To Sleep Handout/Shaken Baby Prevention Handout/Breastfeeding Guide and Packet/Birth Certificate Instructions/Discharge Medication Information for Patients and Families Pocket Guide

## 2022-04-19 NOTE — OB PROVIDER H&P - HISTORY OF PRESENT ILLNESS
Pt. is a 37y/o  EGA 40wks reports of leakage of fluid since midnight and painful contractions. Pt. denies vaginal bleeding. Pt. reports good fetal movement.     AP: Denies  Medical Hx: Thalassemia Trait  Surgical Hx: Breast augmentation and rhinoplasty   OBGYN Hx:    2005 7#2 at [facility]   TOPx2   Meds: PNV  NKDA

## 2022-04-19 NOTE — DISCHARGE NOTE OB - CARE PROVIDER_API CALL
[facility], [place],    [address], [address]  [place], [place], [address]  Phone: [phone]  Fax: (   )    -  Established Patient  Follow Up Time:

## 2022-04-19 NOTE — DISCHARGE NOTE OB - COMMUNITY RESOURCE NAME:
Patient to call and schedule a postpartum visit 4-6 weeks after delivery date at [facility], Level C,  [phone]. Patient to call and schedule a postpartum visit 4-6 weeks after delivery date [facility], [place], [place] [phone].

## 2022-04-19 NOTE — OB PROVIDER LABOR PROGRESS NOTE - ASSESSMENT
37 y/o  @ 40 weeks PROM IOL, seen and examined at bedside c/o unremitting pain s/p epidural and top off. VE unchanged.     - anesthesia to re-evaluated epidural   - continue EFM/toco  - d/w DR Ferguson pgy-4    
A/P: 36y P1 admitted in labor   - will augment with pitocin 2x2   - continuous monitoring/toco   - maternal repositioning/fluids prn for resuscitation     Rufina Vilchis PGY1
37 y/o  now 40wks presents PROM@12a this AM    -continue with pitocin on 4u at the moment with frequent contractions  -patient uncofortable nurse to call for epi bolus  -expect     Maria Alejandra Yousif, PGY1

## 2022-04-19 NOTE — ED ADULT TRIAGE NOTE - CHIEF COMPLAINT QUOTE
Pt c/o contractions. PT states she is due today. Pt states water broke and contractions are 5min apart. No complaints of chest pain, headache, nausea, dizziness, vomiting  SOB, fever, chills verbalized..

## 2022-04-19 NOTE — DISCHARGE NOTE OB - CARE PLAN
1 Principal Discharge DX:	Vaginal delivery  Assessment and plan of treatment:	After discharge, please stay on pelvic rest for 6 weeks, meaning no sexual intercourse, no tampons and no douching.  No driving for 2 weeks as women can loose a lot of blood during delivery and there is a possibility of being lightheaded/fainting.  No lifting objects heavier than baby for two weeks.  Expect to have vaginal bleeding/spotting for up to six weeks. The bleeding should get lighter and more white/light brown with time.  For bleeding soaking more than a pad an hour or passing clots greater than the size of your fist, come in to the emergency department.    Follow up with your doctor in 6 weeks.

## 2022-04-19 NOTE — OB RN DELIVERY SUMMARY - NS_SEPSISRSKCALC_OBGYN_ALL_OB_FT
EOS calculated successfully. EOS Risk Factor: 0.5/1000 live births ([facility] national incidence); GA=40w;Temp=98.6; ROM=10.15; GBS='Unknown'; Antibiotics='No antibiotics or any antibiotics < 2 hrs prior to birth'

## 2022-04-19 NOTE — LACTATION INITIAL EVALUATION - LACTATION INTERVENTIONS
assisted to put baby to rt. breast in cross cradle hold position and left breast in football hold position with deep latch and baby noted to suck with stimulation/initiate/review safe skin-to-skin/initiate/review hand expression/initiate/review techniques for position and latch/initiate/review breast massage/compression/reviewed components of an effective feeding and at least 8 effective feedings per day required/reviewed importance of monitoring infant diapers, the breastfeeding log, and minimum output each day/reviewed risks of unnecessary formula supplementation/reviewed risks of artificial nipples/reviewed benefits and recommendations for rooming in/reviewed feeding on demand/by cue at least 8 times a day/reviewed indications of inadequate milk transfer that would require supplementation

## 2022-04-19 NOTE — DISCHARGE NOTE OB - PLAN OF CARE
After discharge, please stay on pelvic rest for 6 weeks, meaning no sexual intercourse, no tampons and no douching.  No driving for 2 weeks as women can loose a lot of blood during delivery and there is a possibility of being lightheaded/fainting.  No lifting objects heavier than baby for two weeks.  Expect to have vaginal bleeding/spotting for up to six weeks. The bleeding should get lighter and more white/light brown with time.  For bleeding soaking more than a pad an hour or passing clots greater than the size of your fist, come in to the emergency department.    Follow up with your doctor in 6 weeks.

## 2022-04-19 NOTE — DISCHARGE NOTE OB - NS MD DC FALL RISK RISK
For information on Fall & Injury Prevention, visit: [email or web address] OR  [email or web address] OR  https://www.cdc.gov/steadi/patient.html

## 2022-04-19 NOTE — OB PROVIDER LABOR PROGRESS NOTE - NS_SUBJECTIVE/OBJECTIVE_OBGYN_ALL_OB_FT
Patient examined for labor progress given increased pain and pressure. Patient tolerated exam well.
pt seen and evaluated at bedside
PGY1 Labor Note    Patient seen and evaluated at bedside.  Denies complaints.  Comfortable w/ epidural.      T(C): 36.9 (04-19-22 @ 03:58), Max: 36.9 (04-19-22 @ 03:48)  HR: 75 (04-19-22 @ 05:43) (58 - 86)  BP: 128/79 (04-19-22 @ 05:43) (98/85 - 134/82)  RR: 18 (04-19-22 @ 03:58) (16 - 18)  SpO2: 100% (04-19-22 @ 05:43) (91% - 100%)

## 2022-04-19 NOTE — OB NEONATOLOGY/PEDIATRICIAN DELIVERY SUMMARY - NSPEDSNEONOTESA_OBGYN_ALL_OB_FT
Called to Delivery by OB for Category II FHR Tracing. This is a 40 week male born to a 37 y/o ,B+, prenatal labs HIV negative, GBS unknown, Covid neg (), and Hep B, RPR, and Rubella pending via . UTOX + THC. Maternal history of thalassemia trait, breast augmentation, and rhinoplasty. Ob History of TOP x2 with D+C and  FT . Mother presented to L+D with contractions and leakage of fluid on  at ~ 12 am. SROM confirmed  at 00:30 clear fluids ~ 11 hours PTD with transition to terminal mec at delivery. Infant emerged with fair color and initial weak cry. W,D,S,S. Color and cry/respiratory effort quickly improved. Maternal T max 36.9.  Apgars 8,9. EOS 0.13. Mother desires breast feeding, Hep B and declines circ. Darrius Bar.

## 2022-04-19 NOTE — OB PROVIDER H&P - ASSESSMENT
Evidence of rupture of membranes, discussed findings with Dr. Gregory/Dr. Herrera  -Admit to L&D  -Routine and COVID ordered   -For epidural

## 2022-04-19 NOTE — DISCHARGE NOTE OB - COMMUNITY RESOURCE CONTACT NUMBER:
Patient to call and schedule baby's first visit appointment at [facility] Division of General Pediatrics [address], [address], [facility]  [phone] so that baby is evaluated by pediatrician 1 to 2 days after hospital discharge.

## 2022-04-19 NOTE — DISCHARGE NOTE OB - MEDICATION SUMMARY - MEDICATIONS TO TAKE
I will START or STAY ON the medications listed below when I get home from the hospital:    acetaminophen 325 mg oral tablet  -- 3 tab(s) by mouth every 6 hours  -- Indication: For Pain    ibuprofen 600 mg oral tablet  -- 1 tab(s) by mouth every 6 hours  -- Indication: For Pain    Prena1 oral capsule  -- 1 cap(s) by mouth once a day  -- Indication: For Recovery

## 2022-04-19 NOTE — DISCHARGE NOTE OB - PROVIDER TOKENS
FREE:[LAST:[[facility], [place]],PHONE:[phone]],FAX:[(   )    -],ADDRESS:[[address] [place] [address], [address], [address]],ESTABLISHEDPATIENT:[T]]

## 2022-04-19 NOTE — DISCHARGE NOTE OB - PATIENT PORTAL LINK FT
You can access the FollowMyHealth Patient Portal offered by [facility] by registering at the following website: [email or web address]. By joining [employer]’s FollowMyHealth portal, you will also be able to view your health information using other applications (apps) compatible with our system.

## 2022-04-19 NOTE — OB NEONATOLOGY/PEDIATRICIAN DELIVERY SUMMARY - BABY A: APGAR 5 MIN RESP RATE, DELIVERY
[FreeTextEntry1] : Documented by Simon Schultz acting as a scribe for Dr. Kt Ac on 09/09/2020 \par \par All medical record entries made by the Scribe were at my, Dr. Kt Ac's, direction and personally dictated by me on 09/09/2020 . I have reviewed the chart and agree that the record accurately reflects my personal performance of the history, physical exam, assessment and plan. I have also personally directed, reviewed, and agree with the discharge instructions. \par   (2) good, crying

## 2022-04-19 NOTE — OB RN DELIVERY SUMMARY - NSSELHIDDEN_OBGYN_ALL_OB_FT
[NS_DeliveryAttending1_OBGYN_ALL_OB_FT:[id number]],[NS_DeliveryAssist1_OBGYN_ALL_OB_FT:[id number]=],[NS_DeliveryAssist2_OBGYN_ALL_OB_FT:[email or web address]=],[NS_DeliveryRN_OBGYN_ALL_OB_FT:[id number]]

## 2022-04-19 NOTE — OB PROVIDER DELIVERY SUMMARY - NSSELHIDDEN_OBGYN_ALL_OB_FT
[NS_DeliveryAttending1_OBGYN_ALL_OB_FT:[id number]=],[NS_DeliveryAssist1_OBGYN_ALL_OB_FT:[id number]=],[NS_DeliveryAssist2_OBGYN_ALL_OB_FT:[email or web address]=]

## 2022-04-19 NOTE — DISCHARGE NOTE OB - HOSPITAL COURSE
Patient was admitted to L+D for spontaneous rupture of membranes. Of note, patient had received her PNC elsewhere at [place]. As a result, patient had a Utox that was positive for THC.  was uncomplicated and her post-partum course was uncomplicated.   EBL: ***  Hct: 33.8  On Postpartum day ***, patient was discharged home in stable condition, voiding spontaneously, pain well controlled, ambulating, tolerating PO and with normal vital signs. Patient's postpartum birth control plan is ***  Pt plans to follow up with *** Patient was admitted to L+D for spontaneous rupture of membranes. Of note, patient had received her PNC elsewhere at [place]. As a result, patient had a Utox that was positive for THC.  was uncomplicated and her post-partum course was uncomplicated.   EBL: 217  Hct: 33.8  On Postpartum day 2, patient was discharged home in stable condition, voiding spontaneously, pain well controlled, ambulating, tolerating PO and with normal vital signs. Declines post-partum contraception. Patient plans to follow-up with her prior clinic affiliated with [facility].

## 2022-04-20 RX ADMIN — SODIUM CHLORIDE 3 MILLILITER(S): 9 INJECTION INTRAMUSCULAR; INTRAVENOUS; SUBCUTANEOUS at 06:06

## 2022-04-20 RX ADMIN — Medication 975 MILLIGRAM(S): at 21:20

## 2022-04-20 RX ADMIN — Medication 975 MILLIGRAM(S): at 21:50

## 2022-04-20 NOTE — PROGRESS NOTE ADULT - SUBJECTIVE AND OBJECTIVE BOX
OB Progress Note:  PPD#1    S: 35yo  PPD#1 s/p . Patient feels well. Pain is well controlled, tolerating regular diet, passing flatus, voiding spontaneously, and ambulating without difficulty. Denies significant VB, chest pain, shortness of breath, n/v, light-headedness/dizziness. Desires to stay for another day      O:  Vitals:  Vital Signs Last 24 Hrs  T(C): 36.2 (2022 06:00), Max: 37.0 (2022 10:03)  T(F): 97.1 (2022 06:00), Max: 98.6 (2022 10:03)  HR: 73 (2022 06:00) (60 - 105)  BP: 106/69 (2022 06:00) (106/69 - 159/67)  BP(mean): --  RR: 18 (2022 06:00) (16 - 19)  SpO2: 100% (2022 06:00) (81% - 100%)    MEDICATIONS  (STANDING):  acetaminophen     Tablet .. 975 milliGRAM(s) Oral <User Schedule>  diphtheria/tetanus/pertussis (acellular) Vaccine (ADAcel) 0.5 milliLiter(s) IntraMuscular once  ibuprofen  Tablet. 600 milliGRAM(s) Oral every 6 hours  oxytocin Infusion  milliUNIT(s)/Min (1000 mL/Hr) IV Continuous <Continuous>  oxytocin Infusion 333.333 milliUNIT(s)/Min (1000 mL/Hr) IV Continuous <Continuous>  prenatal multivitamin 1 Tablet(s) Oral daily  sodium chloride 0.9% lock flush 3 milliLiter(s) IV Push every 8 hours      Labs:  Blood type: B Positive  Rubella IgG: RPR: Negative                          10.6<L>   9.20 >-----------< 193    (  @ 03:50 )             33.8<L>                  Physical Exam:  General: No acute distress  Respiratory: No respiratory distress. Unlabored breathing   Abdomen: Soft. Non-tender. Non-distended. Fundus is firm  Extremities: No calf tenderness bilaterally     OB Progress Note:  PPD#1    S: 37yo PPD#1 s/p . Patient feels well. Pain is well controlled, tolerating regular diet, passing flatus, voiding spontaneously, and ambulating without difficulty. Denies significant VB, chest pain, shortness of breath, n/v, light-headedness/dizziness. Desires to stay for another day      O:  Vitals:  Vital Signs Last 24 Hrs  T(C): 36.2 (2022 06:00), Max: 37.0 (2022 10:03)  T(F): 97.1 (2022 06:00), Max: 98.6 (2022 10:03)  HR: 73 (2022 06:00) (60 - 105)  BP: 106/69 (2022 06:00) (106/69 - 159/67)  BP(mean): --  RR: 18 (2022 06:00) (16 - 19)  SpO2: 100% (2022 06:00) (81% - 100%)    MEDICATIONS  (STANDING):  acetaminophen     Tablet .. 975 milliGRAM(s) Oral <User Schedule>  diphtheria/tetanus/pertussis (acellular) Vaccine (ADAcel) 0.5 milliLiter(s) IntraMuscular once  ibuprofen  Tablet. 600 milliGRAM(s) Oral every 6 hours  oxytocin Infusion  milliUNIT(s)/Min (1000 mL/Hr) IV Continuous <Continuous>  oxytocin Infusion 333.333 milliUNIT(s)/Min (1000 mL/Hr) IV Continuous <Continuous>  prenatal multivitamin 1 Tablet(s) Oral daily  sodium chloride 0.9% lock flush 3 milliLiter(s) IV Push every 8 hours      Labs:  Blood type: B Positive  Rubella IgG: RPR: Negative                          10.6<L>   9.20 >-----------< 193    (  @ 03:50 )             33.8<L>                  Physical Exam:  General: No acute distress  Respiratory: No respiratory distress. Unlabored breathing   Abdomen: Soft. Non-tender. Non-distended. Fundus is firm  Extremities: No calf tenderness bilaterally

## 2022-04-20 NOTE — PROGRESS NOTE ADULT - ASSESSMENT
A/P: 37yo PPD#1 s/p . Patient is stable and doing well post-partum.   - Pain well controlled, continue current pain regimen  - Increase ambulation  - Continue regular diet    #Post-partum  - Patient desires to stay another day  - Patient plans to follow-up with [facility] (Pt lives around there and partner lives in [place])  - Medical records release signed     Vitaliy Martinez, PGY-1  Ob/Gyn A/P: 35yo PPD#1 s/p . Patient is stable and doing well post-partum.   - Pain well controlled, continue current pain regimen  - Increase ambulation  - Continue regular diet    #Post-partum  - Patient desires to stay another day  - Patient plans to follow-up with [facility] (Pt lives around there and partner lives in [place])  - Medical records release signed   - Declines post-partum contraception    Vitaliy Martinez, PGY-1  Ob/Gyn

## 2022-04-21 VITALS
HEART RATE: 64 BPM | SYSTOLIC BLOOD PRESSURE: 118 MMHG | TEMPERATURE: 98 F | DIASTOLIC BLOOD PRESSURE: 77 MMHG | RESPIRATION RATE: 18 BRPM | OXYGEN SATURATION: 100 %

## 2022-04-21 RX ADMIN — Medication 600 MILLIGRAM(S): at 18:39

## 2022-04-21 RX ADMIN — Medication 600 MILLIGRAM(S): at 05:52

## 2022-04-21 RX ADMIN — Medication 600 MILLIGRAM(S): at 06:22

## 2022-04-21 RX ADMIN — Medication 600 MILLIGRAM(S): at 00:21

## 2022-04-21 RX ADMIN — Medication 600 MILLIGRAM(S): at 00:51

## 2022-04-21 RX ADMIN — SODIUM CHLORIDE 3 MILLILITER(S): 9 INJECTION INTRAMUSCULAR; INTRAVENOUS; SUBCUTANEOUS at 05:52

## 2022-04-21 RX ADMIN — Medication 975 MILLIGRAM(S): at 10:30

## 2022-04-21 RX ADMIN — Medication 1 TABLET(S): at 12:32

## 2022-04-21 RX ADMIN — Medication 975 MILLIGRAM(S): at 09:45

## 2022-04-21 RX ADMIN — Medication 600 MILLIGRAM(S): at 13:15

## 2022-04-21 RX ADMIN — Medication 600 MILLIGRAM(S): at 12:32

## 2022-04-21 NOTE — PROGRESS NOTE ADULT - ASSESSMENT
A/P: 35yo PPD#2 s/p .  Patient is stable and doing well post-partum.    - Pain well controlled, continue current pain regimen  - Increase ambulation  - Continue regular diet  - Discharge planning. For discharge today with plans for follow-up w/ [facility]     Vitaliy Martinez, PGY-1  Ob/Gyn

## 2022-04-21 NOTE — PROGRESS NOTE ADULT - SUBJECTIVE AND OBJECTIVE BOX
OB Progress Note:  PPD#2    S: 36y PPD#2 s/p . Patient feels well. Pain is well controlled, tolerating regular diet, passing flatus, voiding spontaneously, and ambulating without difficulty. Denies significant vaginal bleeding, chest pain, shortness of breath, light-headedness/ dizziness, or n/v.     O:  Vitals:   Vital Signs Last 24 Hrs  T(C): 36.4 (2022 06:00), Max: 36.7 (2022 18:11)  T(F): 97.5 (2022 06:00), Max: 98 (2022 18:11)  HR: 72 (2022 06:00) (72 - 75)  BP: 98/52 (2022 06:00) (98/52 - 104/62)  BP(mean): --  RR: 16 (2022 06:00) (16 - 18)  SpO2: 100% (2022 06:00) (100% - 100%)    MEDICATIONS  (STANDING):  acetaminophen     Tablet .. 975 milliGRAM(s) Oral <User Schedule>  diphtheria/tetanus/pertussis (acellular) Vaccine (ADAcel) 0.5 milliLiter(s) IntraMuscular once  ibuprofen  Tablet. 600 milliGRAM(s) Oral every 6 hours  oxytocin Infusion  milliUNIT(s)/Min (1000 mL/Hr) IV Continuous <Continuous>  oxytocin Infusion 333.333 milliUNIT(s)/Min (1000 mL/Hr) IV Continuous <Continuous>  prenatal multivitamin 1 Tablet(s) Oral daily  sodium chloride 0.9% lock flush 3 milliLiter(s) IV Push every 8 hours    MEDICATIONS  (PRN):  benzocaine 20%/menthol 0.5% Spray 1 Spray(s) Topical every 6 hours PRN for Perineal discomfort  dibucaine 1% Ointment 1 Application(s) Topical every 6 hours PRN Perineal discomfort  diphenhydrAMINE 25 milliGRAM(s) Oral every 6 hours PRN Pruritus  hydrocortisone 1% Cream 1 Application(s) Topical every 6 hours PRN Moderate Pain (4-6)  lanolin Ointment 1 Application(s) Topical every 6 hours PRN nipple soreness  magnesium hydroxide Suspension 30 milliLiter(s) Oral two times a day PRN Constipation  pramoxine 1%/zinc 5% Cream 1 Application(s) Topical every 4 hours PRN Moderate Pain (4-6)  simethicone 80 milliGRAM(s) Chew every 4 hours PRN Gas  witch hazel Pads 1 Application(s) Topical every 4 hours PRN Perineal discomfort      Labs:  Blood type: B Positive  Rubella IgG: RPR: Negative                          10.6<L>   9.20 >-----------< 193    (  @ 03:50 )             33.8<L>                  Physical Exam:  General: No acute distress  Respiratory: No respiratory distress. Unlabored breathing   Abdomen: Soft. Non-tender. Non-distended. Fundus is firm   Extremities: No pitting edema or calf tenderness bilaterally

## 2022-04-24 ENCOUNTER — EMERGENCY (EMERGENCY)
Facility: HOSPITAL | Age: 37
LOS: 1 days | Discharge: HOME | End: 2022-04-24
Attending: EMERGENCY MEDICINE | Admitting: EMERGENCY MEDICINE
Payer: MEDICAID

## 2022-04-24 ENCOUNTER — FORM ENCOUNTER (OUTPATIENT)
Age: 37
End: 2022-04-24

## 2022-04-24 VITALS
TEMPERATURE: 98 F | HEART RATE: 75 BPM | DIASTOLIC BLOOD PRESSURE: 77 MMHG | OXYGEN SATURATION: 100 % | RESPIRATION RATE: 16 BRPM | SYSTOLIC BLOOD PRESSURE: 120 MMHG

## 2022-04-24 VITALS
HEIGHT: 61 IN | RESPIRATION RATE: 15 BRPM | DIASTOLIC BLOOD PRESSURE: 81 MMHG | SYSTOLIC BLOOD PRESSURE: 118 MMHG | OXYGEN SATURATION: 100 % | HEART RATE: 77 BPM | TEMPERATURE: 98 F

## 2022-04-24 DIAGNOSIS — Z98.890 OTHER SPECIFIED POSTPROCEDURAL STATES: Chronic | ICD-10-CM

## 2022-04-24 DIAGNOSIS — Z98.82 BREAST IMPLANT STATUS: Chronic | ICD-10-CM

## 2022-04-24 PROCEDURE — 99284 EMERGENCY DEPT VISIT MOD MDM: CPT

## 2022-04-24 RX ORDER — TETRACAINE/BENZOCAINE/BUTAMBEN 2%-14%-2%
1 OINTMENT (GRAM) TOPICAL ONCE
Refills: 0 | Status: DISCONTINUED | OUTPATIENT
Start: 2022-04-24 | End: 2022-04-27

## 2022-04-24 RX ORDER — ACETAMINOPHEN 500 MG
650 TABLET ORAL ONCE
Refills: 0 | Status: COMPLETED | OUTPATIENT
Start: 2022-04-24 | End: 2022-04-24

## 2022-04-24 RX ORDER — ACETAMINOPHEN 500 MG
975 TABLET ORAL ONCE
Refills: 0 | Status: DISCONTINUED | OUTPATIENT
Start: 2022-04-24 | End: 2022-04-24

## 2022-04-24 RX ORDER — ONDANSETRON 8 MG/1
4 TABLET, FILM COATED ORAL ONCE
Refills: 0 | Status: COMPLETED | OUTPATIENT
Start: 2022-04-24 | End: 2022-04-24

## 2022-04-24 RX ADMIN — ONDANSETRON 4 MILLIGRAM(S): 8 TABLET, FILM COATED ORAL at 03:52

## 2022-04-24 RX ADMIN — Medication 650 MILLIGRAM(S): at 03:30

## 2022-04-24 NOTE — ED PROVIDER NOTE - NSFOLLOWUPINSTRUCTIONS_ED_ALL_ED_FT
You were seen in the Emergency Department for laceration check. You were seen by OBGYN, please follow up outpatient as scheduled. You can take acetaminophen for pain.      1) Continue all previously prescribed medications as directed.    2) Follow up with your primary care physician - take copies of your results.    3) Return to the Emergency Department for worsening or persistent symptoms, and/or ANY NEW OR CONCERNING SYMPTOMS. You were seen in the Emergency Department for laceration check. You were seen by OBGYN, please follow up outpatient as scheduled. You can take acetaminophen and/or ibuprofen for pain.      1) Continue all previously prescribed medications as directed.    2) Follow up with your primary care physician - take copies of your results.    3) Return to the Emergency Department for worsening or persistent symptoms, and/or ANY NEW OR CONCERNING SYMPTOMS.

## 2022-04-24 NOTE — CONSULT NOTE ADULT - ASSESSMENT
A/P: 36y  PPD#5 s/p  presents with vaginal pain and distress over appearance of postpartum vagina. Pt afebrile with VSS and no evidence of infection on exam. Pain likely 2/2 irritation from right periurethral. Pt became agitated and verbally combative when told there was no evidence of wound opening or infection. Pt insisted that is was not properly repaired because her vaginal opening was larger than prior to delivery. Pt demanding repeat surgery to further close introitus, stating that this did not happen after her first delivery when she was 18 years old. Discussed that there was no medical indication as this would be a cosmetic procedure and that we would advise allowing time for her body to heal prior to seeking plastic surgery care, if pt so desires in future. Dr Ross counseled pt extensively on how the body changes after delivery and that widening of vagina and cervical prolapse immediately postpartum are common. Discussed that vaginas change after multiple deliveries and with age. Pt continued to be combative despite counseling.   - Advise adding Motrin 600mg q6h to Tylenol 1000mg q6h   - Encourage iona-bottles to decrease burning with urination  - Recommend social work consult given degree of agitation and concern for other social factors contributing to distress out of proportion to exam findings        Maryann Kumar, PGY-2    seen with Dr Ross  A/P: 36y  PPD#5 s/p  presents with vaginal pain and distress over appearance of postpartum vagina. Pt afebrile with VSS and no evidence of infection on exam. Pain likely 2/2 irritation from right periurethral. Vaginal appearance well healing without issue.  Pt became agitated and verbally combative when told there was no evidence of wound opening or infection. Pt insisted that is was not properly repaired because her vaginal opening was larger than prior to the  delivery and cervix was in view and lower than previously. Pt demanding repeat surgery to further close introitus, stating that this did not happen after her first delivery when she was 18 years old. Discussed that there was no medical indication as this would be a cosmetic procedure and that we would advise allowing time for her body to heal prior to seeking plastic surgery care, if pt so desires in future. Dr Ross counseled pt extensively on how the body changes after delivery and that widening of vagina and cervical prolapse immediately postpartum are common. Discussed that vaginas change after multiple deliveries and with age. Pt continued to be combative despite counseling to the point of yelling. We tried to elicit state of mind by asking questions about care of baby and sleep, however patient became irate at these questions and asked if we were "for real."  - Advise adding Motrin 600mg q6h to Tylenol 1000mg q6h   - Encourage iona-bottles to decrease burning with urination  - Recommend social work consult to ER given degree of agitation and concern for other social factors contributing to distress out of proportion to exam findings        Maryann Kumar, PGY-2    seen with Dr Ross     Gyn Attending    This patient was seen and examined by me with PGY2-  Agree with assessment above.  Patient with well healing vagina and perineum s/p - without evidence of infection or dehiscence of laceration repair-Patient very upset  by the appearance of her PP vagina and wished it "fixed' with new repair.  I explained as outlined above how time, age, and deliveries can change vaginas but that she was repaired  properly.  She was not happy with the explaination and is still insisting that her vagina wasn't repaired correctly. We gave her instruction re: pain meds, iona bottle, and time.  We discussed with ER staff, patient likely could benefit from social work.    Jesus RIOS

## 2022-04-24 NOTE — ED PROVIDER NOTE - PROGRESS NOTE DETAILS
RODRIGO Wu (PGY-2) - pt seen by GYN, RODRIGO Wu (PGY-2) - pt seen by OB, lac repair intact, no bleeding, no intervention. Per OB encounter, pt is upset at the way her vagina looks more so than concern for pain. Demanding a plastic surgeon. Pt will follow up with her GYN at [place].

## 2022-04-24 NOTE — ED PROVIDER NOTE - NSFOLLOWUPCLINICS_GEN_ALL_ED_FT
[facility]st[facility]  Gynceology/OB  [address] [place] [address]  Phone: [phone]  Fax:

## 2022-04-24 NOTE — ED PROVIDER NOTE - PHYSICAL EXAMINATION
General: NAD  HEENT: NCAT, PERRL  Cardiac: RRR, 2+ peripheral pulses  Chest: CTA  Abdomen: soft, non-distended, bowel sounds present, no ttp, no rebound or guarding  : chaperoned by***  Extremities: no peripheral edema, calf tenderness, or leg size discrepancies  Skin: no rashes  Neuro: AAOx3, motor and sensory grossly intact  Psych: mood and affect appropriate General: NAD  HEENT: NCAT, PERRL  Cardiac: RRR, 2+ peripheral pulses  Chest: CTA  Abdomen: soft, non-distended, bowel sounds present, no ttp, no rebound or guarding  : chaperoned by ANGEL Lundberg, pt w/ small tear, single visualized suture in place, no bleeding, pt unable to tolerate bimanual exam.   Extremities: no peripheral edema, calf tenderness, or leg size discrepancies  Skin: no rashes  Neuro: AAOx3, motor and sensory grossly intact  Psych: mood and affect appropriate

## 2022-04-24 NOTE — CONSULT NOTE ADULT - SUBJECTIVE AND OBJECTIVE BOX
GYN Consult Note    HPI:  36y G P LMP presents with       Name of GYN Physician:    ObHx:    GynHx: Denies fibroids, cysts, endometriosis, STI's, abnormal pap smears. Last pap:     PMHx:  PSHx:  Meds:  All:  FH: No h/o breast, ovarian or uterine cancer  SH:  Denies etoh, cigarette smoking, recreational drugs.       PAST MEDICAL & SURGICAL HISTORY:  No pertinent past medical history    Thalassemia trait    H/O breast augmentation    H/O rhinoplasty        REVIEW OF SYSTEMS      General:	    Skin/Breast:  	  Ophthalmologic:  	  ENMT:	    Respiratory and Thorax:  	  Cardiovascular:	    Gastrointestinal:	    Genitourinary:	    Musculoskeletal:	    Neurological:	    Psychiatric:	    Hematology/Lymphatics:	    Endocrine:	    Allergic/Immunologic:	    MEDICATIONS  (STANDING):  tetracaine/benzocaine/butamben Spray 1 Spray(s) Topical Once    MEDICATIONS  (PRN):      Allergies    azithromycin (Short breath)  Robitussin (Short breath)    Intolerances        REVIEW OF SYSTEMS  General: denies fevers, chills, tiredness  Skin/Breast: denies breast pain  Respiratory and Thorax: denies shortness of breath, denies cough  Cardiovascular: denies chest pain and denies palpitations  Gastrointestinal: denies abdominal pain, nausea/ vomiting	  Genitourinary: denies dysuria, increased urinary frequency, urgency	  Constitutional, Cardiovascular, Respiratory, Gastrointestinal, Genitourinary, Musculoskeletal and Integumentary review of systems are normal except as noted. 	      Vital Signs Last 24 Hrs  T(C): 36.6 (24 Apr 2022 02:21), Max: 36.6 (24 Apr 2022 02:21)  T(F): 97.9 (24 Apr 2022 02:21), Max: 97.9 (24 Apr 2022 02:21)  HR: 77 (24 Apr 2022 02:21) (77 - 77)  BP: 118/81 (24 Apr 2022 02:21) (118/81 - 118/81)  BP(mean): --  RR: 15 (24 Apr 2022 02:21) (15 - 15)  SpO2: 100% (24 Apr 2022 02:21) (100% - 100%)        PHYSICAL EXAM:   Gen: Comfortable, NAD  Psych: appropriate mood & affect  CV: RRR  Pulm: CTAB  Abd: Soft, NT, ND  Ext: Warm & well perfused. No edema or tenderness bilaterally  Pelvic: Normal external genitalia, urethra, clitoris, and anus. Normal labia bilaterally. Normal pattern of hair growth along pubic area. Normal-appearing skin, no lesion, no masses.  Spec Exam: Normal vaginal epithelium, normal appearing cervix. No lesions or masses appriciated on the cervix. No lesions or masses noted along the vaginal epithelium. Normal vaginal discharge, no odor.   Bimanual exam: ***uterus, nontender. No adenxal fullness or masses.     LABS:                Blood Type: B Positive        RADIOLOGY & ADDITIONAL STUDIES:    A/P:      Maryann Kumar, PGY-2    D/W  GYN Consult Note    HPI:  36y  PPD#5 s/p  presents with vaginal pain. Pt states she had severe vaginal pain that started yesterday She was washing with hot water and is concerned she may have "melted the stitches" because she can not feel the stitches. She feels like her vagina is open and that she can see "her insides." Pt upset about prolapse of cervix and appearance of vaginal orifice as it is "more open than last delivery."  She additionally endorses blood and green/black discharge, as well as burning with urination. She has been taking Tylenol 650mg with little relief. States she has felt chills every night since delivery. Patient breastfeeding baby only, was able to get breast pump today but has not pumped yet. When asked about sleep and mood, became upset and refused to discuss further.       Name of GYN Physician: Sherrie Lyman     ObHx:  NSVDx2  TOPx2     PMHx: Thalassemia trait   PSHx: Breast augmentation, rhinoplasty   Meds: Tylenol PRN  All: Azithromycin, Robitussin   FH: Non contributory   SH: Living with partner and baby      REVIEW OF SYSTEMS  General: denies tiredness  Skin/Breast: denies breast pain  Respiratory and Thorax: denies shortness of breath, denies cough  Cardiovascular: denies chest pain and denies palpitations  Gastrointestinal: denies abdominal pain, nausea/ vomiting	  Genitourinary: denies dysuria, increased urinary frequency, urgency	  Constitutional, Cardiovascular, Respiratory, Gastrointestinal, Genitourinary, Musculoskeletal and Integumentary review of systems are normal except as noted. 	      Vital Signs Last 24 Hrs  T(C): 36.6 (2022 02:21), Max: 36.6 (2022 02:21)  T(F): 97.9 (2022 02:21), Max: 97.9 (2022 02:21)  HR: 77 (2022 02:21) (77 - 77)  BP: 118/81 (2022 02:21) (118/81 - 118/81)  BP(mean): --  RR: 15 (2022 02:21) (15 - 15)  SpO2: 100% (2022 02:21) (100% - 100%)        PHYSICAL EXAM:   Gen: Crying in bed, sitting up   Pulm: nl WOB on RA  Abd: Soft, NT, ND  Ext: Warm & well perfused. No edema or tenderness bilaterally  Pelvic: First degree laceration noted at left posterior aspect of introitus with Vicryl suture in place. No purulent drainage surrounding area without bleeding, erythema or induration. Overall appears well haling. When palpated with q-tip at bedside, did not elicit pain. On further inspection right iona-urethral tear noted. No surrounding erythema or induration. Also appears to be well healing. On palpation with q-tip at bedside, this appears to be localized area of pain. Pt refused internal exam.      RADIOLOGY & ADDITIONAL STUDIES:     GYN Consult Note    HPI:  36y  PPD#5 s/p  presents with vaginal pain. Pt states she had severe vaginal pain that started yesterday. She was washing with hot water and is concerned she may have "melted the stitches" because she can not feel the stitches. She feels like her vagina is open and that she can see "her insides." Pt upset and crying about prolapse of cervix and appearance of vaginal orifice as it is "more open than last delivery."  She additionally endorses minimal blood, as well as burning with urination. She has been taking Tylenol 650mg with little relief. States she has felt chills every night since delivery. Patient breastfeeding baby only, was able to get breast pump today but has not pumped yet. When asked about sleep and mood, became upset and refused to discuss further.       Name of GYN Physician: Sherrie Lyman     ObHx:  NSVDx2  TOPx2     PMHx: Thalassemia trait   PSHx: Breast augmentation, rhinoplasty   Meds: Tylenol PRN  All: Azithromycin, Robitussin   FH: Non contributory   SH: Living with partner and baby      REVIEW OF SYSTEMS  General: denies tiredness  Skin/Breast: denies breast pain  Respiratory and Thorax: denies shortness of breath, denies cough  Cardiovascular: denies chest pain and denies palpitations  Gastrointestinal: denies abdominal pain, nausea/ vomiting	  Genitourinary: denies dysuria, increased urinary frequency, urgency	  Constitutional, Cardiovascular, Respiratory, Gastrointestinal, Genitourinary, Musculoskeletal and Integumentary review of systems are normal except as noted. 	      Vital Signs Last 24 Hrs  T(C): 36.6 (2022 02:21), Max: 36.6 (2022 02:21)  T(F): 97.9 (2022 02:21), Max: 97.9 (2022 02:21)  HR: 77 (2022 02:21) (77 - 77)  BP: 118/81 (2022 02:21) (118/81 - 118/81)  BP(mean): --  RR: 15 (2022 02:21) (15 - 15)  SpO2: 100% (2022 02:21) (100% - 100%)        PHYSICAL EXAM:   Gen: Crying in bed, sitting up   Pulm: nl WOB on RA  Abd: Soft, NT, ND  Ext: Warm & well perfused. No edema or tenderness bilaterally  Pelvic: First degree laceration noted at left posterior aspect of introitus with Vicryl suture in place. No purulent drainage, surrounding area without bleeding, erythema or induration. Overall appears well healing. When palpated with q-tip at bedside, did not elicit pain. On further inspection right iona-urethral tear noted. No surrounding erythema or induration. Also appears to be well healing. On palpation with q-tip at bedside, this appears to be the localized area of pain. Pt refused internal exam.      RADIOLOGY & ADDITIONAL STUDIES:

## 2022-04-24 NOTE — ED ADULT TRIAGE NOTE - CHIEF COMPLAINT QUOTE
presents after delivery on 4/19 patient reports sutures ripped off. bleeding noted. No complaints of chest pain, headache, nausea, dizziness, vomiting  SOB, fever, chills verbalized.

## 2022-04-24 NOTE — ED PROVIDER NOTE - CLINICAL SUMMARY MEDICAL DECISION MAKING FREE TEXT BOX
Pt w/ 1st degree vaginal tear w/ intact suture. VSS. Exam w/o bleeding. Will provide analgesia. d/w GYN.

## 2022-04-24 NOTE — ED PROVIDER NOTE - PRO INTERPRETER NEED 2
I have no update. She can call [employer] at [phone] and request an update if she is curious. I will call her and let her know.   English

## 2022-04-24 NOTE — ED PROVIDER NOTE - ATTENDING CONTRIBUTION TO CARE
HPI: 35yo F  PDD #5 here for bleeding from vaginal lac. Vaginal delivery, 1st degree lac w/ 2-o cromic sutures placed. Yesterday, some sutures fell out, today w/ bleeding and pain. Denies any trauma to region. Subjective fever 2 days ago but nothing today. Otherwise no medical complaints.  EXAM: uncomfortable appearing, crying but consolable. Pelvic (Female Chaperone ANGEL Lundberg) small tear in lower right portion of vagina, no active bleeding severe tenderness to palpation , no pus, no fluctuance.   MDM: pt with no Past Medical History that had a stage 1 tear that was sutured by OB after surgery that presents with pain in vaginal region states she had a tear in the sutures. no bleeding noted, area appears well without signs of infection/abscess. Pt was given PO tylenol prior to arrival and now will try to control pain in ED and consult OBGYN for recs.

## 2022-04-24 NOTE — ED ADULT NURSE NOTE - NSIMPLEMENTINTERV_GEN_ALL_ED
Implemented All Universal Safety Interventions:  [place] to call system. Call bell, personal items and telephone within reach. Instruct patient to call for assistance. Room bathroom lighting operational. Non-slip footwear when patient is off stretcher. Physically safe environment: no spills, clutter or unnecessary equipment. Stretcher in lowest position, wheels locked, appropriate side rails in place.

## 2022-04-24 NOTE — ED PROVIDER NOTE - PATIENT PORTAL LINK FT
You can access the FollowMyHealth Patient Portal offered by [facility] by registering at the following website: [email or web address] By joining [employer]’s FollowMyHealth portal, you will also be able to view your health information using other applications (apps) compatible with our system.

## 2022-04-24 NOTE — ED PROVIDER NOTE - OBJECTIVE STATEMENT
35yo F  PDD #5 here for bleeding from vaginal lac. Vaginal delivery, 1st degree lac w/ 2-o cromic sutures placed. Yesterday, some sutures fell out, today w/ bleeding and pain. Subjective fever 2 days ago but nothing today. Otherwise no medical complaints.

## 2023-02-09 NOTE — OB RN TRIAGE NOTE - NS_[email or web address]_OBGYN_ALL_OB_NU
-- DO NOT REPLY / DO NOT REPLY ALL --  -- Message is from [facility] ([facility]) --    General Patient Message: Patient was on travel, just got back and has tested positive for covid, with symptoms. Last year Dr Franklin prescribed paxlovid, and it really helped. She is wondering if she can prescribe again? Please contact patient if needed.       Alternative phone number: [phone]    Can a detailed message be left? Yes    Message Turnaround:     Is it Working Hours? Yes - Working Hours     [place]:    Please give this turnaround time to the caller:   \"This message will be sent to [state Provider's name]. The clinical team will fulfill your request as soon as they review your message.\"                 1

## 2023-11-08 NOTE — ED PROVIDER NOTE - LOCATION
Patient arrived via:    __x___ambulatory    _____wheelchair    _____stretcher      Domestic violence screen    __x____negative______positive    Breast Implants:    _______yes ____x____no neck tooth hip/knee/no obvious swelling, erythema, or deformity, + TTP to patella and femur, limited ROM to knee, sensation and strength intact

## 2025-01-30 NOTE — ED ADULT NURSE NOTE - MODE OF DISCHARGE
Insurance Card PCP - Ronnie Rey wanted to make appt w/ him.    Transferred to that # [phone]   Ambulatory